# Patient Record
Sex: FEMALE
[De-identification: names, ages, dates, MRNs, and addresses within clinical notes are randomized per-mention and may not be internally consistent; named-entity substitution may affect disease eponyms.]

---

## 2018-06-13 ENCOUNTER — HOSPITAL ENCOUNTER (OUTPATIENT)
Dept: HOSPITAL 88 - RAD | Age: 53
End: 2018-06-13
Attending: FAMILY MEDICINE
Payer: COMMERCIAL

## 2018-06-13 DIAGNOSIS — S60.211A: Primary | ICD-10-CM

## 2018-06-13 NOTE — DIAGNOSTIC IMAGING REPORT
PROCEDURE:X-RAY RIGHT WRIST, COMPLETE

 

COMPARISON:None.

 

INDICATIONS:FALL

 

FINDINGS:

There are no fractures, dislocations, lytic or blastic lesions. The 

bones are well-mineralized. The soft-tissues are unremarkable.

 

CONCLUSION:

No acute fracture or dislocation of the right wrist.

 

Dictated by:  Misha Segura M.D. on 6/13/2018 at 15:03     

Electronically approved by:  Misha Segura M.D. on 6/13/2018 at 15:03

## 2019-06-15 ENCOUNTER — HOSPITAL ENCOUNTER (OUTPATIENT)
Dept: HOSPITAL 88 - OR | Age: 54
Discharge: HOME | End: 2019-06-15
Attending: INTERNAL MEDICINE
Payer: COMMERCIAL

## 2019-06-15 VITALS — DIASTOLIC BLOOD PRESSURE: 59 MMHG | SYSTOLIC BLOOD PRESSURE: 120 MMHG

## 2019-06-15 DIAGNOSIS — K64.8: ICD-10-CM

## 2019-06-15 DIAGNOSIS — K63.5: ICD-10-CM

## 2019-06-15 DIAGNOSIS — Z80.0: ICD-10-CM

## 2019-06-15 DIAGNOSIS — Z01.810: ICD-10-CM

## 2019-06-15 DIAGNOSIS — Z12.11: Primary | ICD-10-CM

## 2019-06-15 PROCEDURE — 45385 COLONOSCOPY W/LESION REMOVAL: CPT

## 2019-06-15 PROCEDURE — 93005 ELECTROCARDIOGRAM TRACING: CPT

## 2019-06-15 PROCEDURE — 45384 COLONOSCOPY W/LESION REMOVAL: CPT

## 2019-06-15 NOTE — OPERATIVE REPORT
DATE OF PROCEDURE:  06/15/2019

 

SURGEON:  Todd Drummond MD

 

PROCEDURES:  Colonoscopy with polypectomy.

 

INDICATION FOR COLONOSCOPY:  Surveillance colonoscopy, personal history of colon polyps.

 

MEDICATIONS:  The patient was done under MAC, please see anesthesiologist's note.

 

PROCEDURE IN DETAIL:  With the patient in left lateral decubitus position, flexible

fiberoptic Olympus colonoscope was inserted into the rectum with ease and advanced all

the way to the cecum.  It was then withdrawn slowly.  Mucosa overlying the cecum,

ascending colon, transverse colon appeared to be within normal limits.  One minute

hyperplastic-appearing polyp was hot biopsied from the descending colon.  Four polyps

were hot biopsied, one polyp was snared from the sigmoid.  The rectum appeared to be

within normal limits.  The scope was then retroflexed into the distal rectum and small

internal hemorrhoids were noted, none of which was actively bleeding.  The scope was

then straightened out, it was subsequently withdrawn.  The patient tolerated procedure

well. 

 

IMPRESSION:  

1. Descending colon polyp, hot biopsied.

2. Sigmoid colon polyps x5, one snared and four hot biopsied.

3. Internal hemorrhoids, none actively bleeding.

 

PLAN:  Follow up histology.  Initiate high-fiber, low-fat diet.  Initiate high-fiber

supplement.  The patient might benefit from a followup colonoscopy in 5 years. 

 

 

 

 

______________________________

Todd Drummond MD

 

[facility]

D:  06/15/2019 09:13:18

T:  06/15/2019 09:39:16

Job #:  [id number]

 

cc:            Andrew Escobar MD

## 2019-06-17 NOTE — XMS REPORT
Patient Summary Document

                             Created on: 2019



REINA ECHOLS

External Reference #: [id number]

: 1965

Sex: Female



Demographics







                          Address                   [address] [place]  [address]

 

                          Home Phone                [phone]

 

                          Preferred Language        Unknown

 

                          Marital Status            Unknown

 

                          Zoroastrian Affiliation     Unknown

 

                          Race                      Unknown

 

                                        Additional Race(s) 

 

 

                          Ethnic Group              Unknown





Author







                          Author                    [facility]

 

                          [facility]

 

                          Address                   Unknown

 

                          Phone                     Unavailable







Support







                Name            Relationship    Address         Phone

 

                    GARRETT ECHOLS     PRS                 [address] [place]  [address] [phone]

 

                    GARRETT ECHOLS     PRS                 [address] [place], [place]  [phone]







Care Team Providers







                    Care Team Member Name    Role                Phone

 

                    JR GARCIA    Unavailable         Unavailable







Payers







             Payer Name    Policy Type    Policy Number    Effective Date    Expiration Date







Problems

This patient has no known problems.



Allergies, Adverse Reactions, Alerts







          Allergy Name    Allergy Type    Status    Severity    Reaction(s)    Onset Date    Inactive 

Date                      Treating Clinician        Comments

 

        sulfamethoxazole    DA      Active                  2017 00:00:00                     

 

        trimethoprim    DA      Active    SV              2017 00:00:00                     







Medications

This patient has no known medications.



Results







           Test Description    Test Time    Test Comments    Text Results    Atomic Results    Result

 Comments

 

                WRIST COMPLETE RIGHT    2018 15:03:00                        [place] [address]      Patient Name: 
REINA ECHOLS   MR #: [id number]    : 1965 Age/Sex: 53/F  Acct #: 
[id number] Req #: [id number]  Adm Physician:     Ordered by: JR GARCIA MD  Report #: [id number]   Location: [facility]  Room/Bed:     
____________________________________________________________________________
_______________________    Procedure: [id number] DX/WRIST COMPLETE RIGHT  Exam 
Date: 18                            Exam Time: 1435       REPORT STATUS: 
Signed    PROCEDURE:   X-RAY RIGHT WRIST, COMPLETE       COMPARISON:   None.    
  INDICATIONS:   FALL       FINDINGS:      There are no fractures, dislocations,
lytic or blastic lesions. The    bones are well-mineralized. The soft-tissues 
are unremarkable.          CONCLUSION:      No acute fracture or dislocation of 
the right wrist.       Dictated by:  Misha Cali M.D. on 2018 at 15:03  
     Electronically approved by:  Misha Cali M.D. on 2018 at 15:03     
             Dictated By: MISHA CALI MD  Electronically Signed By: MISHA CALI MD on 18 1503  Transcribed By: MANDY on 18 1503       COPY 
TO:   JR GARCIA MD